# Patient Record
Sex: FEMALE | Race: WHITE | URBAN - METROPOLITAN AREA
[De-identification: names, ages, dates, MRNs, and addresses within clinical notes are randomized per-mention and may not be internally consistent; named-entity substitution may affect disease eponyms.]

---

## 2020-04-22 ENCOUNTER — OFFICE VISIT (OUTPATIENT)
Dept: URBAN - METROPOLITAN AREA CLINIC 71 | Facility: CLINIC | Age: 75
End: 2020-04-22
Payer: MEDICARE

## 2020-04-22 DIAGNOSIS — H02.881 MEIBOMIAN GLAND DYSFUNCTION RIGHT UPPER EYELID: Primary | ICD-10-CM

## 2020-04-22 PROCEDURE — 92002 INTRM OPH EXAM NEW PATIENT: CPT | Performed by: OPHTHALMOLOGY

## 2020-04-22 RX ORDER — ACETAMINOPHEN 500 MG
TABLET ORAL
Qty: 0 | Refills: 0 | Status: ACTIVE
Start: 2020-04-22

## 2020-04-22 RX ORDER — METRONIDAZOLE 7.5 MG/G
0.75 % CREAM TOPICAL
Qty: 0 | Refills: 0 | Status: ACTIVE
Start: 2020-04-22

## 2020-04-22 RX ORDER — RANITIDINE 150 MG/1
150 MG TABLET, FILM COATED ORAL
Qty: 0 | Refills: 0 | Status: ACTIVE
Start: 2020-04-22

## 2020-04-22 RX ORDER — TRIAMCINOLONE ACETONIDE 0.25 MG/ML
0.025 % LOTION TOPICAL
Qty: 0 | Refills: 0 | Status: ACTIVE
Start: 2020-04-22

## 2020-04-22 RX ORDER — DICLOFENAC 10 MG/G
1 % GEL TOPICAL
Qty: 0 | Refills: 0 | Status: ACTIVE
Start: 2020-04-22

## 2020-04-22 RX ORDER — FLUTICASONE PROPIONATE 50 UG/1
SPRAY, METERED NASAL
Qty: 0 | Refills: 0 | Status: ACTIVE
Start: 2020-04-22

## 2020-04-22 RX ORDER — DEXAMETHASONE SODIUM PHOSPHATE 1 MG/ML
0.1 % SOLUTION/ DROPS OPHTHALMIC
Qty: 1 | Refills: 0 | Status: INACTIVE
Start: 2020-04-22 | End: 2020-05-26

## 2020-04-22 RX ORDER — CLINDAMYCIN HYDROCHLORIDE 75 MG/1
75 MG CAPSULE ORAL
Qty: 0 | Refills: 0 | Status: ACTIVE
Start: 2020-04-22

## 2020-04-22 RX ORDER — ACETAMINOPHEN 500 MG
500 MG TABLET ORAL
Qty: 0 | Refills: 0 | Status: ACTIVE
Start: 2020-04-22

## 2020-04-22 RX ORDER — LORATADINE 10 MG/1
10 MG TABLET ORAL
Qty: 0 | Refills: 0 | Status: ACTIVE
Start: 2020-04-22

## 2020-04-22 RX ORDER — AZITHROMYCIN DIHYDRATE 250 MG/1
250 MG TABLET, FILM COATED ORAL
Qty: 0 | Refills: 0 | Status: INACTIVE
Start: 2020-04-22 | End: 2020-04-22

## 2020-04-22 RX ORDER — OMEPRAZOLE 20 MG/1
20 MG CAPSULE, DELAYED RELEASE ORAL
Qty: 0 | Refills: 0 | Status: ACTIVE
Start: 2020-04-22

## 2020-04-22 RX ORDER — VALACYCLOVIR 500 MG/1
500 MG TABLET ORAL
Qty: 0 | Refills: 0 | Status: ACTIVE
Start: 2020-04-22

## 2020-04-22 RX ORDER — AZITHROMYCIN DIHYDRATE 250 MG/1
250 MG TABLET, FILM COATED ORAL
Qty: 1 | Refills: 0 | Status: ACTIVE
Start: 2020-04-22

## 2020-04-22 RX ORDER — ALBUTEROL SULFATE 90 UG/1
AEROSOL, METERED RESPIRATORY (INHALATION)
Qty: 0 | Refills: 0 | Status: ACTIVE
Start: 2020-04-22

## 2020-04-22 ASSESSMENT — INTRAOCULAR PRESSURE
OS: 16
OD: 18

## 2020-04-22 NOTE — IMPRESSION/PLAN
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 Bilateral.
Start dexamethasone drop TID OD Start Z-pack Plan: Meibomian gland dysfunction is inflammation of the oil glands in the eye lids, coupled with thickening of the oily secretions. It often accompanies blepharitis, the skin condition rosacea, and it makes dry eye syndrome worse Meibomian gland dysfunction may improve with therapy consisting of warm compresses, topical ophthalmic antibiotic/steroid combinations, oral antibiotics, and oral anti-inflammatory agents (such as omega-3-fatty acids and fish oil) . [facility] Contact office if meibomian gland dysfunction does not improve or worsens despite treatment. A few glands were capped off, nicked with 18g needle with no complications.

## 2020-06-03 ENCOUNTER — OFFICE VISIT (OUTPATIENT)
Dept: URBAN - METROPOLITAN AREA CLINIC 72 | Facility: CLINIC | Age: 75
End: 2020-06-03
Payer: MEDICARE

## 2020-06-03 DIAGNOSIS — H00.11 CHALAZION RIGHT UPPER EYELID: ICD-10-CM

## 2020-06-03 PROCEDURE — 92012 INTRM OPH EXAM EST PATIENT: CPT | Performed by: OPHTHALMOLOGY

## 2020-06-03 RX ORDER — AZITHROMYCIN 250 MG/1
250 MG TABLET, FILM COATED ORAL
Qty: 12 | Refills: 3 | Status: ACTIVE
Start: 2020-06-03

## 2020-06-03 ASSESSMENT — INTRAOCULAR PRESSURE
OS: 16
OD: 16

## 2020-06-03 NOTE — IMPRESSION/PLAN
Impression: Meibomian gland dysfunction right upper eyelid: H02.881 Bilateral 
start azithromycin one tablet PO once a week
continue lid messages
in the shower use baby shampoo and q-tip to squeeze the glands 
continue dexamethasone one drop on finger and rub on lids for 1 week. Plan: Meibomian gland dysfunction is inflammation of the oil glands in the eye lids, coupled with thickening of the oily secretions. It often accompanies blepharitis, the skin condition rosacea, and it makes dry eye syndrome worse Meibomian gland dysfunction may improve with therapy consisting of warm compresses, topical ophthalmic antibiotic/steroid combinations, oral antibiotics, and oral anti-inflammatory agents (such as omega-3-fatty acids and fish oil) . [facility] Contact office if meibomian gland dysfunction does not improve or worsens despite treatment.

## 2020-06-03 NOTE — IMPRESSION/PLAN
Impression: Chalazion right upper eyelid: H00.11. Plan: OD: Discussed diagnosis in detail with patient. Discussed treatment options with patient. Patient instructed to apply warm compresses.